# Patient Record
Sex: MALE | Race: WHITE | NOT HISPANIC OR LATINO | Employment: UNEMPLOYED | URBAN - NONMETROPOLITAN AREA
[De-identification: names, ages, dates, MRNs, and addresses within clinical notes are randomized per-mention and may not be internally consistent; named-entity substitution may affect disease eponyms.]

---

## 2021-03-15 ENCOUNTER — LAB (OUTPATIENT)
Dept: LAB | Facility: HOSPITAL | Age: 66
End: 2021-03-15

## 2021-03-15 ENCOUNTER — TRANSCRIBE ORDERS (OUTPATIENT)
Dept: ADMINISTRATIVE | Facility: HOSPITAL | Age: 66
End: 2021-03-15

## 2021-03-15 DIAGNOSIS — F51.4 SLEEP TERROR DISORDER: ICD-10-CM

## 2021-03-15 DIAGNOSIS — I10 ESSENTIAL HYPERTENSION: ICD-10-CM

## 2021-03-15 DIAGNOSIS — J06.9 ACUTE UPPER RESPIRATORY INFECTION: ICD-10-CM

## 2021-03-15 DIAGNOSIS — E78.01 ESSENTIAL FAMILIAL HYPERCHOLESTEROLEMIA: Primary | ICD-10-CM

## 2021-03-15 DIAGNOSIS — J32.9 CHRONIC SINUSITIS, UNSPECIFIED LOCATION: ICD-10-CM

## 2021-03-15 DIAGNOSIS — E78.01 ESSENTIAL FAMILIAL HYPERCHOLESTEROLEMIA: ICD-10-CM

## 2021-03-15 LAB
BASOPHILS # BLD AUTO: 0.05 10*3/MM3 (ref 0–0.2)
BASOPHILS NFR BLD AUTO: 0.7 % (ref 0–1.5)
DEPRECATED RDW RBC AUTO: 40.6 FL (ref 37–54)
EOSINOPHIL # BLD AUTO: 0.23 10*3/MM3 (ref 0–0.4)
EOSINOPHIL NFR BLD AUTO: 3 % (ref 0.3–6.2)
ERYTHROCYTE [DISTWIDTH] IN BLOOD BY AUTOMATED COUNT: 12.4 % (ref 12.3–15.4)
HBA1C MFR BLD: 5.91 % (ref 4.8–5.6)
HCT VFR BLD AUTO: 42.3 % (ref 37.5–51)
HGB BLD-MCNC: 14.2 G/DL (ref 13–17.7)
IMM GRANULOCYTES # BLD AUTO: 0.02 10*3/MM3 (ref 0–0.05)
IMM GRANULOCYTES NFR BLD AUTO: 0.3 % (ref 0–0.5)
LYMPHOCYTES # BLD AUTO: 1.79 10*3/MM3 (ref 0.7–3.1)
LYMPHOCYTES NFR BLD AUTO: 23.4 % (ref 19.6–45.3)
MCH RBC QN AUTO: 30.3 PG (ref 26.6–33)
MCHC RBC AUTO-ENTMCNC: 33.6 G/DL (ref 31.5–35.7)
MCV RBC AUTO: 90.4 FL (ref 79–97)
MONOCYTES # BLD AUTO: 0.59 10*3/MM3 (ref 0.1–0.9)
MONOCYTES NFR BLD AUTO: 7.7 % (ref 5–12)
NEUTROPHILS NFR BLD AUTO: 4.96 10*3/MM3 (ref 1.7–7)
NEUTROPHILS NFR BLD AUTO: 64.9 % (ref 42.7–76)
NRBC BLD AUTO-RTO: 0 /100 WBC (ref 0–0.2)
PLATELET # BLD AUTO: 263 10*3/MM3 (ref 140–450)
PMV BLD AUTO: 10.5 FL (ref 6–12)
RBC # BLD AUTO: 4.68 10*6/MM3 (ref 4.14–5.8)
WBC # BLD AUTO: 7.64 10*3/MM3 (ref 3.4–10.8)

## 2021-03-15 PROCEDURE — 80053 COMPREHEN METABOLIC PANEL: CPT

## 2021-03-15 PROCEDURE — 86140 C-REACTIVE PROTEIN: CPT

## 2021-03-15 PROCEDURE — 82306 VITAMIN D 25 HYDROXY: CPT

## 2021-03-15 PROCEDURE — 84439 ASSAY OF FREE THYROXINE: CPT

## 2021-03-15 PROCEDURE — 36415 COLL VENOUS BLD VENIPUNCTURE: CPT

## 2021-03-15 PROCEDURE — 85025 COMPLETE CBC W/AUTO DIFF WBC: CPT

## 2021-03-15 PROCEDURE — 83036 HEMOGLOBIN GLYCOSYLATED A1C: CPT

## 2021-03-15 PROCEDURE — 84443 ASSAY THYROID STIM HORMONE: CPT

## 2021-03-15 PROCEDURE — 80061 LIPID PANEL: CPT

## 2021-03-16 LAB
25(OH)D3 SERPL-MCNC: 16.7 NG/ML (ref 30–100)
ALBUMIN SERPL-MCNC: 4.2 G/DL (ref 3.5–5.2)
ALBUMIN/GLOB SERPL: 1.4 G/DL
ALP SERPL-CCNC: 92 U/L (ref 39–117)
ALT SERPL W P-5'-P-CCNC: 40 U/L (ref 1–41)
ANION GAP SERPL CALCULATED.3IONS-SCNC: 10.2 MMOL/L (ref 5–15)
AST SERPL-CCNC: 24 U/L (ref 1–40)
BILIRUB SERPL-MCNC: 0.3 MG/DL (ref 0–1.2)
BUN SERPL-MCNC: 16 MG/DL (ref 8–23)
BUN/CREAT SERPL: 15.5 (ref 7–25)
CALCIUM SPEC-SCNC: 9.3 MG/DL (ref 8.6–10.5)
CHLORIDE SERPL-SCNC: 101 MMOL/L (ref 98–107)
CHOLEST SERPL-MCNC: 155 MG/DL (ref 0–200)
CO2 SERPL-SCNC: 29.8 MMOL/L (ref 22–29)
CREAT SERPL-MCNC: 1.03 MG/DL (ref 0.76–1.27)
CRP SERPL-MCNC: 0.67 MG/DL (ref 0–0.5)
GFR SERPL CREATININE-BSD FRML MDRD: 72 ML/MIN/1.73
GLOBULIN UR ELPH-MCNC: 3 GM/DL
GLUCOSE SERPL-MCNC: 98 MG/DL (ref 65–99)
HDLC SERPL-MCNC: 30 MG/DL (ref 40–60)
LDLC SERPL CALC-MCNC: 107 MG/DL (ref 0–100)
LDLC/HDLC SERPL: 3.53 {RATIO}
POTASSIUM SERPL-SCNC: 4.1 MMOL/L (ref 3.5–5.2)
PROT SERPL-MCNC: 7.2 G/DL (ref 6–8.5)
SODIUM SERPL-SCNC: 141 MMOL/L (ref 136–145)
T4 FREE SERPL-MCNC: 1.05 NG/DL (ref 0.93–1.7)
TRIGL SERPL-MCNC: 95 MG/DL (ref 0–150)
TSH SERPL DL<=0.05 MIU/L-ACNC: 1.06 UIU/ML (ref 0.27–4.2)
VLDLC SERPL-MCNC: 18 MG/DL (ref 5–40)

## 2021-11-05 ENCOUNTER — HOSPITAL ENCOUNTER (EMERGENCY)
Facility: HOSPITAL | Age: 66
Discharge: HOME | End: 2021-11-06
Attending: EMERGENCY MEDICINE | Admitting: EMERGENCY MEDICINE

## 2021-11-05 ENCOUNTER — APPOINTMENT (OUTPATIENT)
Dept: CT IMAGING | Facility: HOSPITAL | Age: 66
End: 2021-11-05

## 2021-11-05 DIAGNOSIS — M54.50 ACUTE RIGHT-SIDED LOW BACK PAIN WITHOUT SCIATICA: Primary | ICD-10-CM

## 2021-11-05 LAB
BASOPHILS # BLD AUTO: 0.05 10*3/MM3 (ref 0–0.2)
BASOPHILS NFR BLD AUTO: 0.5 % (ref 0–1.5)
DEPRECATED RDW RBC AUTO: 43.7 FL (ref 37–54)
EOSINOPHIL # BLD AUTO: 0.32 10*3/MM3 (ref 0–0.4)
EOSINOPHIL NFR BLD AUTO: 3 % (ref 0.3–6.2)
ERYTHROCYTE [DISTWIDTH] IN BLOOD BY AUTOMATED COUNT: 13 % (ref 12.3–15.4)
HCT VFR BLD AUTO: 42.6 % (ref 37.5–51)
HGB BLD-MCNC: 13.6 G/DL (ref 13–17.7)
IMM GRANULOCYTES # BLD AUTO: 0.02 10*3/MM3 (ref 0–0.05)
IMM GRANULOCYTES NFR BLD AUTO: 0.2 % (ref 0–0.5)
LYMPHOCYTES # BLD AUTO: 2.98 10*3/MM3 (ref 0.7–3.1)
LYMPHOCYTES NFR BLD AUTO: 28.3 % (ref 19.6–45.3)
MCH RBC QN AUTO: 29.5 PG (ref 26.6–33)
MCHC RBC AUTO-ENTMCNC: 31.9 G/DL (ref 31.5–35.7)
MCV RBC AUTO: 92.4 FL (ref 79–97)
MONOCYTES # BLD AUTO: 1.16 10*3/MM3 (ref 0.1–0.9)
MONOCYTES NFR BLD AUTO: 11 % (ref 5–12)
NEUTROPHILS NFR BLD AUTO: 57 % (ref 42.7–76)
NEUTROPHILS NFR BLD AUTO: 6 10*3/MM3 (ref 1.7–7)
NRBC BLD AUTO-RTO: 0 /100 WBC (ref 0–0.2)
PLATELET # BLD AUTO: 242 10*3/MM3 (ref 140–450)
PMV BLD AUTO: 9.6 FL (ref 6–12)
RBC # BLD AUTO: 4.61 10*6/MM3 (ref 4.14–5.8)
WBC # BLD AUTO: 10.53 10*3/MM3 (ref 3.4–10.8)

## 2021-11-05 PROCEDURE — 25010000002 ONDANSETRON PER 1 MG: Performed by: NURSE PRACTITIONER

## 2021-11-05 PROCEDURE — 25010000002 HYDROMORPHONE 1 MG/ML SOLUTION: Performed by: NURSE PRACTITIONER

## 2021-11-05 PROCEDURE — 85025 COMPLETE CBC W/AUTO DIFF WBC: CPT | Performed by: NURSE PRACTITIONER

## 2021-11-05 PROCEDURE — 72131 CT LUMBAR SPINE W/O DYE: CPT

## 2021-11-05 PROCEDURE — 81003 URINALYSIS AUTO W/O SCOPE: CPT | Performed by: NURSE PRACTITIONER

## 2021-11-05 PROCEDURE — 99283 EMERGENCY DEPT VISIT LOW MDM: CPT

## 2021-11-05 PROCEDURE — 96375 TX/PRO/DX INJ NEW DRUG ADDON: CPT

## 2021-11-05 PROCEDURE — 96374 THER/PROPH/DIAG INJ IV PUSH: CPT

## 2021-11-05 PROCEDURE — 80053 COMPREHEN METABOLIC PANEL: CPT | Performed by: NURSE PRACTITIONER

## 2021-11-05 RX ORDER — ONDANSETRON 2 MG/ML
4 INJECTION INTRAMUSCULAR; INTRAVENOUS ONCE
Status: COMPLETED | OUTPATIENT
Start: 2021-11-05 | End: 2021-11-05

## 2021-11-05 RX ORDER — SODIUM CHLORIDE 0.9 % (FLUSH) 0.9 %
10 SYRINGE (ML) INJECTION AS NEEDED
Status: DISCONTINUED | OUTPATIENT
Start: 2021-11-05 | End: 2021-11-06 | Stop reason: HOSPADM

## 2021-11-05 RX ADMIN — HYDROMORPHONE HYDROCHLORIDE 1 MG: 1 INJECTION, SOLUTION INTRAMUSCULAR; INTRAVENOUS; SUBCUTANEOUS at 23:52

## 2021-11-05 RX ADMIN — ONDANSETRON 4 MG: 2 INJECTION INTRAMUSCULAR; INTRAVENOUS at 23:52

## 2021-11-06 VITALS
BODY MASS INDEX: 36.1 KG/M2 | OXYGEN SATURATION: 96 % | TEMPERATURE: 97.8 F | WEIGHT: 230 LBS | SYSTOLIC BLOOD PRESSURE: 134 MMHG | RESPIRATION RATE: 20 BRPM | DIASTOLIC BLOOD PRESSURE: 60 MMHG | HEART RATE: 56 BPM | HEIGHT: 67 IN

## 2021-11-06 LAB
ALBUMIN SERPL-MCNC: 4.49 G/DL (ref 3.5–5.2)
ALBUMIN/GLOB SERPL: 1.4 G/DL
ALP SERPL-CCNC: 79 U/L (ref 39–117)
ALT SERPL W P-5'-P-CCNC: 42 U/L (ref 1–41)
ANION GAP SERPL CALCULATED.3IONS-SCNC: 13 MMOL/L (ref 5–15)
AST SERPL-CCNC: 29 U/L (ref 1–40)
BILIRUB SERPL-MCNC: 0.3 MG/DL (ref 0–1.2)
BILIRUB UR QL STRIP: NEGATIVE
BUN SERPL-MCNC: 16 MG/DL (ref 8–23)
BUN/CREAT SERPL: 16.3 (ref 7–25)
CALCIUM SPEC-SCNC: 9.5 MG/DL (ref 8.6–10.5)
CHLORIDE SERPL-SCNC: 102 MMOL/L (ref 98–107)
CLARITY UR: CLEAR
CO2 SERPL-SCNC: 26 MMOL/L (ref 22–29)
COLOR UR: YELLOW
CREAT SERPL-MCNC: 0.98 MG/DL (ref 0.76–1.27)
GFR SERPL CREATININE-BSD FRML MDRD: 77 ML/MIN/1.73
GLOBULIN UR ELPH-MCNC: 3.1 GM/DL
GLUCOSE SERPL-MCNC: 99 MG/DL (ref 65–99)
GLUCOSE UR STRIP-MCNC: NEGATIVE MG/DL
HGB UR QL STRIP.AUTO: NEGATIVE
HOLD SPECIMEN: NORMAL
HOLD SPECIMEN: NORMAL
KETONES UR QL STRIP: NEGATIVE
LEUKOCYTE ESTERASE UR QL STRIP.AUTO: NEGATIVE
NITRITE UR QL STRIP: NEGATIVE
PH UR STRIP.AUTO: 6.5 [PH] (ref 5–8)
POTASSIUM SERPL-SCNC: 3.9 MMOL/L (ref 3.5–5.2)
PROT SERPL-MCNC: 7.6 G/DL (ref 6–8.5)
PROT UR QL STRIP: NEGATIVE
SODIUM SERPL-SCNC: 141 MMOL/L (ref 136–145)
SP GR UR STRIP: 1.02 (ref 1–1.03)
UROBILINOGEN UR QL STRIP: NORMAL
WHOLE BLOOD HOLD SPECIMEN: NORMAL

## 2021-11-06 PROCEDURE — 25010000002 DEXAMETHASONE PER 1 MG: Performed by: NURSE PRACTITIONER

## 2021-11-06 PROCEDURE — 96372 THER/PROPH/DIAG INJ SC/IM: CPT

## 2021-11-06 RX ORDER — CYCLOBENZAPRINE HCL 10 MG
10 TABLET ORAL 3 TIMES DAILY PRN
Qty: 21 TABLET | Refills: 0 | Status: SHIPPED | OUTPATIENT
Start: 2021-11-06 | End: 2021-11-13

## 2021-11-06 RX ORDER — METHYLPREDNISOLONE 4 MG/1
TABLET ORAL
Qty: 21 TABLET | Refills: 0 | Status: SHIPPED | OUTPATIENT
Start: 2021-11-06 | End: 2022-09-07

## 2021-11-06 RX ORDER — DEXAMETHASONE SODIUM PHOSPHATE 10 MG/ML
8 INJECTION INTRAMUSCULAR; INTRAVENOUS ONCE
Status: COMPLETED | OUTPATIENT
Start: 2021-11-06 | End: 2021-11-06

## 2021-11-06 RX ADMIN — DEXAMETHASONE SODIUM PHOSPHATE 8 MG: 10 INJECTION INTRAMUSCULAR; INTRAVENOUS at 00:59

## 2021-11-06 NOTE — ED PROVIDER NOTES
"Subjective   Patient is a 66-year-old male with past medical history significant for hypertension, heart disease, GERD, BPH, and depression.  He presents to the ED today with complaints of lower back pain that started this morning.  He reports pain to be a 9 out of 10 and describes pain as \"aching \", denies any radiation of pain.  He denies any known injury.  He states that he does have a history of back pain and states that he had back surgery about 12 years ago by Dr. Schwab with neurosurgery in [place].  Patient denies any other significant complaints at this time.      Back Pain  Location:  Lumbar spine  Quality:  Aching  Radiates to:  Does not radiate  Pain severity:  Severe  Timing:  Constant  Progression:  Waxing and waning  Context: not falling, not jumping from heights, not lifting heavy objects, not occupational injury, not physical stress, not recent injury and not twisting    Relieved by:  Nothing  Worsened by:  Ambulation  Associated symptoms: no abdominal pain, no abdominal swelling, no bladder incontinence, no bowel incontinence, no chest pain, no dysuria, no fever, no headaches, no leg pain, no numbness, no paresthesias, no pelvic pain, no perianal numbness, no tingling and no weakness        Review of Systems   Constitutional: Negative.  Negative for fever.   HENT: Negative.    Eyes: Negative.    Respiratory: Negative.    Cardiovascular: Negative.  Negative for chest pain.   Gastrointestinal: Negative.  Negative for abdominal pain and bowel incontinence.   Endocrine: Negative.    Genitourinary: Negative.  Negative for bladder incontinence, dysuria and pelvic pain.   Musculoskeletal: Positive for back pain. Negative for neck pain and neck stiffness.   Skin: Negative.    Allergic/Immunologic: Negative.    Neurological: Negative.  Negative for tingling, weakness, numbness, headaches and paresthesias.   Hematological: Negative.    Psychiatric/Behavioral: Negative.    All other systems reviewed and are " negative.      No past medical history on file.    Allergies   Allergen Reactions   • [place] Anaphylaxis       No past surgical history on file.    No family history on file.    Social History     Socioeconomic History   • Marital status:            Objective   Physical Exam  Vitals and nursing note reviewed.   Constitutional:       General: He is not in acute distress.     Appearance: He is well-developed and normal weight. He is not ill-appearing, toxic-appearing or diaphoretic.   HENT:      Head: Normocephalic and atraumatic.      Right Ear: External ear normal.      Left Ear: External ear normal.      Nose: Nose normal.      Mouth/Throat:      Mouth: Mucous membranes are moist.      Pharynx: Oropharynx is clear.   Eyes:      Conjunctiva/sclera: Conjunctivae normal.      Pupils: Pupils are equal, round, and reactive to light.   Neck:      Vascular: No JVD.      Trachea: No tracheal deviation.   Cardiovascular:      Rate and Rhythm: Normal rate and regular rhythm.      Pulses: Normal pulses.      Heart sounds: Normal heart sounds. No murmur heard.      Pulmonary:      Effort: Pulmonary effort is normal. No respiratory distress.      Breath sounds: Normal breath sounds. No wheezing.   Abdominal:      General: Bowel sounds are normal.      Palpations: Abdomen is soft.      Tenderness: There is no abdominal tenderness.   Musculoskeletal:         General: Tenderness present. No deformity.      Cervical back: Normal range of motion and neck supple.      Lumbar back: Tenderness present. Decreased range of motion.   Skin:     General: Skin is warm and dry.      Capillary Refill: Capillary refill takes less than 2 seconds.      Coloration: Skin is not pale.      Findings: No erythema or rash.   Neurological:      General: No focal deficit present.      Mental Status: He is alert and oriented to person, place, and time. Mental status is at baseline.      Cranial Nerves: No cranial nerve deficit.   Psychiatric:          Behavior: Behavior normal.         Thought Content: Thought content normal.         Procedures       Results for orders placed or performed during the hospital encounter of 11/05/21   Comprehensive Metabolic Panel    Specimen: Blood   Result Value Ref Range    Glucose 99 65 - 99 mg/dL    BUN 16 8 - 23 mg/dL    Creatinine 0.98 0.76 - 1.27 mg/dL    Sodium 141 136 - 145 mmol/L    Potassium 3.9 3.5 - 5.2 mmol/L    Chloride 102 98 - 107 mmol/L    CO2 26.0 22.0 - 29.0 mmol/L    Calcium 9.5 8.6 - 10.5 mg/dL    Total Protein 7.6 6.0 - 8.5 g/dL    Albumin 4.49 3.50 - 5.20 g/dL    ALT (SGPT) 42 (H) 1 - 41 U/L    AST (SGOT) 29 1 - 40 U/L    Alkaline Phosphatase 79 39 - 117 U/L    Total Bilirubin 0.3 0.0 - 1.2 mg/dL    eGFR Non African Amer 77 >60 mL/min/1.73    Globulin 3.1 gm/dL    A/G Ratio 1.4 g/dL    BUN/Creatinine Ratio 16.3 7.0 - 25.0    Anion Gap 13.0 5.0 - 15.0 mmol/L   Urinalysis With Culture If Indicated - Urine, Clean Catch    Specimen: Urine, Clean Catch   Result Value Ref Range    Color, UA Yellow Yellow, Straw    Appearance, UA Clear Clear    pH, UA 6.5 5.0 - 8.0    Specific Gravity, UA 1.023 1.005 - 1.030    Glucose, UA Negative Negative    Ketones, UA Negative Negative    Bilirubin, UA Negative Negative    Blood, UA Negative Negative    Protein, UA Negative Negative    Leuk Esterase, UA Negative Negative    Nitrite, UA Negative Negative    Urobilinogen, UA 1.0 E.U./dL 0.2 - 1.0 E.U./dL   CBC Auto Differential    Specimen: Blood   Result Value Ref Range    WBC 10.53 3.40 - 10.80 10*3/mm3    RBC 4.61 4.14 - 5.80 10*6/mm3    Hemoglobin 13.6 13.0 - 17.7 g/dL    Hematocrit 42.6 37.5 - 51.0 %    MCV 92.4 79.0 - 97.0 fL    MCH 29.5 26.6 - 33.0 pg    MCHC 31.9 31.5 - 35.7 g/dL    RDW 13.0 12.3 - 15.4 %    RDW-SD 43.7 37.0 - 54.0 fl    MPV 9.6 6.0 - 12.0 fL    Platelets 242 140 - 450 10*3/mm3    Neutrophil % 57.0 42.7 - 76.0 %    Lymphocyte % 28.3 19.6 - 45.3 %    Monocyte % 11.0 5.0 - 12.0 %    Eosinophil % 3.0 0.3 -  6.2 %    Basophil % 0.5 0.0 - 1.5 %    Immature Grans % 0.2 0.0 - 0.5 %    Neutrophils, Absolute 6.00 1.70 - 7.00 10*3/mm3    Lymphocytes, Absolute 2.98 0.70 - 3.10 10*3/mm3    Monocytes, Absolute 1.16 (H) 0.10 - 0.90 10*3/mm3    Eosinophils, Absolute 0.32 0.00 - 0.40 10*3/mm3    Basophils, Absolute 0.05 0.00 - 0.20 10*3/mm3    Immature Grans, Absolute 0.02 0.00 - 0.05 10*3/mm3    nRBC 0.0 0.0 - 0.2 /100 WBC   Green Top (Gel)   Result Value Ref Range    Extra Tube Hold for add-ons.    Lavender Top   Result Value Ref Range    Extra Tube hold for add-on    Gold Top - SST   Result Value Ref Range    Extra Tube Hold for add-ons.        ED Course  ED Course as of 11/06/21 0106   Sat Nov 06, 2021   0047 CT Lumbar Spine Without Contrast  IMPRESSION:  No acute changes. There is multilevel lumbar spondylosis secondary to a congenitally narrow canal as well as multilevel disc and facet disease. No evidence of a right-sided disc herniation. The right-sided lumbar foramina are mildly narrowed from  degenerative changes. The most severe foraminal narrowing is at L3-4 on the left. There are postoperative changes on the left at L5 and S1. [MB]      ED Course User Index  [MB] Arleen Aden APRN                                           [facility]    Final diagnoses:   Acute right-sided low back pain without sciatica       ED Disposition  ED Disposition     ED Disposition Condition Comment    Discharge Stable           Larry Vasquez PA  [address] [place], [place] [id number]  [phone]    Call in 2 days      Aubrey Schwab MD    Call in 2 days           Medication List      New Prescriptions    cyclobenzaprine 10 MG tablet  Commonly known as: FLEXERIL  Take 1 tablet by mouth 3 (Three) Times a Day As Needed for Muscle Spasms for up to 7 days.     methylPREDNISolone 4 MG dose pack  Commonly known as: MEDROL  Take as directed on package instructions.           Where to Get Your Medications      These medications were sent to [facility] - [facility] - [address] [facility] [address] - [phone]  - [phone] FX  [address] [address] [address]    Phone: [phone]   · cyclobenzaprine 10 MG tablet  · methylPREDNISolone 4 MG dose pack          Arleen Aden APRN  11/06/21 0058       Satinder Waters MD  11/06/21 0101

## 2022-09-07 ENCOUNTER — OFFICE VISIT (OUTPATIENT)
Dept: UROLOGY | Facility: CLINIC | Age: 67
End: 2022-09-07

## 2022-09-07 VITALS — HEIGHT: 67 IN | WEIGHT: 275 LBS | BODY MASS INDEX: 43.16 KG/M2

## 2022-09-07 DIAGNOSIS — R97.20 BPH WITH ELEVATED PSA: Primary | ICD-10-CM

## 2022-09-07 DIAGNOSIS — R35.0 FREQUENCY OF URINATION: ICD-10-CM

## 2022-09-07 DIAGNOSIS — N40.0 BPH WITHOUT OBSTRUCTION/LOWER URINARY TRACT SYMPTOMS: ICD-10-CM

## 2022-09-07 DIAGNOSIS — N40.0 BPH WITH ELEVATED PSA: Primary | ICD-10-CM

## 2022-09-07 LAB
BILIRUB BLD-MCNC: ABNORMAL MG/DL
CLARITY, POC: CLEAR
COLOR UR: ABNORMAL
EXPIRATION DATE: ABNORMAL
GLUCOSE UR STRIP-MCNC: NEGATIVE MG/DL
KETONES UR QL: NEGATIVE
LEUKOCYTE EST, POC: NEGATIVE
Lab: ABNORMAL
NITRITE UR-MCNC: NEGATIVE MG/ML
PH UR: 6 [PH] (ref 5–8)
PROT UR STRIP-MCNC: ABNORMAL MG/DL
RBC # UR STRIP: NEGATIVE /UL
SP GR UR: 1.02 (ref 1–1.03)
UROBILINOGEN UR QL: NORMAL

## 2022-09-07 PROCEDURE — 84154 ASSAY OF PSA FREE: CPT | Performed by: NURSE PRACTITIONER

## 2022-09-07 PROCEDURE — 84153 ASSAY OF PSA TOTAL: CPT | Performed by: NURSE PRACTITIONER

## 2022-09-07 PROCEDURE — 99204 OFFICE O/P NEW MOD 45 MIN: CPT | Performed by: NURSE PRACTITIONER

## 2022-09-07 PROCEDURE — 81003 URINALYSIS AUTO W/O SCOPE: CPT | Performed by: NURSE PRACTITIONER

## 2022-09-07 RX ORDER — ESOMEPRAZOLE MAGNESIUM 40 MG/1
1 CAPSULE, DELAYED RELEASE ORAL DAILY
COMMUNITY
Start: 2021-06-28

## 2022-09-07 RX ORDER — RAMIPRIL 10 MG/1
1 CAPSULE ORAL DAILY
COMMUNITY
Start: 2021-06-28

## 2022-09-07 RX ORDER — CITALOPRAM 20 MG/1
1 TABLET ORAL DAILY
COMMUNITY
Start: 2021-06-28

## 2022-09-07 RX ORDER — TAMSULOSIN HYDROCHLORIDE 0.4 MG/1
1 CAPSULE ORAL
COMMUNITY
Start: 2021-06-28

## 2022-09-07 RX ORDER — CETIRIZINE HYDROCHLORIDE 10 MG/1
1 TABLET ORAL DAILY
COMMUNITY
Start: 2021-06-28

## 2022-09-07 RX ORDER — METHOCARBAMOL 500 MG/1
TABLET, FILM COATED ORAL EVERY 6 HOURS
COMMUNITY
Start: 2021-06-28

## 2022-09-07 RX ORDER — AMLODIPINE BESYLATE 5 MG/1
1 TABLET ORAL DAILY
COMMUNITY
Start: 2021-06-28

## 2022-09-07 RX ORDER — HYDROCHLOROTHIAZIDE 25 MG/1
1 TABLET ORAL DAILY
COMMUNITY
Start: 2021-06-28

## 2022-09-07 RX ORDER — ATENOLOL 25 MG/1
1 TABLET ORAL DAILY
COMMUNITY
Start: 2021-06-28

## 2022-09-07 NOTE — PROGRESS NOTES
Chief Complaint  BPH WITH ELEVATED PSA/LUTS (NEW PATIENT WITH ELEVATED PSA OF 8.0/BPH WITH LUTS)    Jerrell Whitten presents to [facility] for BPH WITH PSA OF 8.0(0.0-4.0)  History of Present Illness    MR MARY ELLEN WHITTEN is a very pleasant 66-year-old male with a significant history of BPH with LUTS who presents to clinic today for evaluation.  The patient has been referred to us by his PCP with concerns of elevated PSA of 8.0 drawn on 07/26/2022.  Patient reports his last PSA in July 2021 was 3.0.  He is unsure of his other prior PSA results, patient was recently started on Flomax -medications for his prostate, and denies any side effects from the medications.  He reports a negative family history of prostate cancer.  He denies any recent infection.    He is also here for his yearly prostate exam which is unremarkable.There is no nodularity or any suspicious rectal abnormalities. He denies having any urinary symptoms of frequency urgency or dysuria. He has nocturia 1-2 times occasionally, denies any issues with recurrent UTIs, burning with urination, urinary hesitancy OR post void dribbling. He has been compliant on his daily regimen of Flomax and Cialis as prescribed, patient denies any side effects from medications.    Patient reports he was a  for greater than 20 years.  However, he is glad to be doing better at this age, Patient denies any issues with BPH recently. Denies pelvic pressure or supra pubic discomfort. Denies abdominal pain , flank pain,  He does not have any CVA tenderness, no N/V/D.  He has chronic back pain from prior surgeries over 12 years ago by Dr. Schwab with neurosurgery in [place]. Denies perineal discomfort or any feeling of sitting on eggshells consistent with prostatitis.  Denies chills or fevers, he does not have N/V/D.  His urine dipstick today is completely negative for any infection, it is negative for  "gross/microscopic hematuria. His IPSS score is 5, his PVR is 0cc    Besides a BMI of 43.07, the patient is relatively healthy.  He reports a history of HTN, HLD, JESSICA, GERD, BPH and Depression.  She reports a family history of breast cancer his mom and sister, denies history of colon cancer, bladder cancer, uterine cancer, or prostate cancer.    The rest of his PMHx as listed in chart    Objective   Vital Signs:   Ht 170.2 cm (67\")   Wt 125 kg (275 lb)   BMI 43.07 kg/m²       ROS:   Review of Systems   Constitutional: Positive for activity change, fatigue and unexpected weight gain. Negative for appetite change, chills, diaphoresis, fever and unexpected weight loss.   HENT: Negative for congestion, ear discharge, ear pain, nosebleeds, rhinorrhea, sinus pressure and sore throat.    Eyes: Negative for blurred vision, double vision, photophobia, pain, redness and visual disturbance.   Respiratory: Negative for apnea, cough, chest tightness, shortness of breath, wheezing and stridor.    Cardiovascular: Negative for chest pain and palpitations.   Gastrointestinal: Positive for abdominal distention, constipation and GERD. Negative for abdominal pain, diarrhea, nausea and vomiting.   Endocrine: Negative for polydipsia, polyphagia and polyuria.   Genitourinary: Positive for frequency, nocturia, urgency and urinary incontinence (  Post void dribbling). Negative for decreased urine volume, difficulty urinating, discharge, dysuria, flank pain, genital sores, hematuria, penile pain, penile swelling, scrotal swelling and testicular pain.   Musculoskeletal: Positive for back pain and myalgias. Negative for arthralgias and joint swelling.   Skin: Positive for dry skin and pallor. Negative for rash and wound.   Neurological: Negative for dizziness, tremors, syncope, weakness, light-headedness, memory problem and confusion.   Psychiatric/Behavioral: Positive for sleep disturbance and stress. Negative for agitation, behavioral " problems and decreased concentration.        Physical Exam  Constitutional:       General: He is in acute distress.      Appearance: He is well-developed. He is obese.   HENT:      Head: Normocephalic and atraumatic.      Right Ear: External ear normal.      Left Ear: External ear normal.   Eyes:      General:         Right eye: No discharge.         Left eye: No discharge.      Conjunctiva/sclera: Conjunctivae normal.      Pupils: Pupils are equal, round, and reactive to light.   Neck:      Thyroid: No thyromegaly.      Trachea: No tracheal deviation.   Cardiovascular:      Rate and Rhythm: Normal rate and regular rhythm.      Heart sounds: No murmur heard.    No friction rub.   Pulmonary:      Effort: Pulmonary effort is normal. No respiratory distress.      Breath sounds: Normal breath sounds. No stridor.   Abdominal:      General: Bowel sounds are normal. There is distension.      Palpations: Abdomen is soft.      Tenderness: There is abdominal tenderness. There is no guarding.   Genitourinary:     Penis: Normal and uncircumcised. No tenderness or discharge.       Testes: Normal.      Rectum: Normal. Guaiac result negative.      Comments: YURI negative for nodules, He has good rectal tone, and ENLARGED, BUT smooth firm prostate. There is no nodularity or any suspicious rectal abnormalities  Otherwise normal external genitalia. Bilateral descended testes without any masses, left slightly and hanging lower than the right testicles. There are no inguinal hernias or abnormalities noted.     Musculoskeletal:         General: Tenderness present. No deformity. Normal range of motion.      Cervical back: Normal range of motion and neck supple.   Skin:     General: Skin is warm and dry.      Capillary Refill: Capillary refill takes less than 2 seconds.      Coloration: Skin is not pale.   Neurological:      Mental Status: He is alert and oriented to person, place, and time.      Cranial Nerves: No cranial nerve deficit.       Motor: Weakness present.      Coordination: Coordination normal.   Psychiatric:         Behavior: Behavior normal.         Thought Content: Thought content normal.         Judgment: Judgment normal.        IPSS Questionnaire (AUA-7):  Over the past month…    1)  How often have you had a sensation of not emptying your bladder completely after you finish urinating?  1 - Less than 1 time in 5   2)  How often have you had to urinate again less than two hours after you finished urinating? 0 - Not at all   3)  How often have you found you stopped and started again several times when you urinated?  1 - Less than 1 time in 5   4) How difficult have you found it to postpone urination?  0 - Not at all   5) How often have you had a weak urinary stream?  0 - Not at all   6) How often have you had to push or strain to begin urination?  1 - Less than 1 time in 5   7) How many times did you most typically get up to urinate from the time you went to bed until the time you got up in the morning?  2 - 2 times   Total score:  0-7 mildly symptomatic                                                                  5    8-19 moderately symptomatic    20-35 severely symptomatic       Result Review :     UA    Urinalysis 11/5/21 9/7/22   Specific [place], UA 1.023    Ketones, UA Negative Negative   Blood, UA Negative    Leukocytes, UA Negative Negative   Nitrite, UA Negative                PSA    PSA 9/7/22   PSA 3.8      Comments are available for some flowsheets but are not being displayed.                Assessment and Plan    Problem List Items Addressed This Visit    None     Visit Diagnoses     BPH with elevated PSA    -  Primary    Relevant Orders    PSA Total+% Free (Serial) (Completed)    BPH without obstruction/lower urinary tract symptoms        Relevant Orders    PSA Total+% Free (Serial) (Completed)    Frequency of urination        Relevant Orders    POC Urinalysis Dipstick, Automated (Completed)          Patient reports  that he is not currently experiencing any symptoms of urinary incontinence.    Class 2 Severe Obesity (BMI >=35 and <=39.9). Obesity-related health conditions include the following: obstructive sleep apnea, hypertension, dyslipidemias, GERD and urinary stress incontinence. Obesity is improving with lifestyle modifications. BMI is 43.07 kg/M2. We discussed portion control and increasing exercise.    RADIOLOGY (CT AND/OR KUB):    CT Abdomen and Pelvis: No results found for this or any previous visit.     CT Stone Protocol: No results found for this or any previous visit.     KUB: No results found for this or any previous visit.       LABS (3 MONTHS):    Office Visit on 09/07/2022   Component Date Value Ref Range Status   • Color 09/07/2022 Dark Yellow  Yellow, Straw, Dark Yellow, Rosa Final   • Clarity, UA 09/07/2022 Clear  Clear Final   • Specific Gravity  09/07/2022 1.025  1.005 - 1.030 Final   • pH, Urine 09/07/2022 6.0  5.0 - 8.0 Final   • Leukocytes 09/07/2022 Negative  Negative Final   • Nitrite, UA 09/07/2022 Negative  Negative Final   • Protein, POC 09/07/2022 Trace (A) Negative mg/dL Final   • Glucose, UA 09/07/2022 Negative  Negative mg/dL Final   • Ketones, UA 09/07/2022 Negative  Negative Final   • Urobilinogen, UA 09/07/2022 Normal  Normal, 0.2 E.U./dL Final   • Bilirubin 09/07/2022 Small (1+) (A) Negative Final   • Blood, UA 09/07/2022 Negative  Negative Final   • Lot Number 09/07/2022 [id number]   Final   • Expiration Date 09/07/2022 12/21/2023   Final   • PSA 09/07/2022 3.8  0.0 - 4.0 ng/mL Final    Roche ECLIA methodology.  According to the American Urological Association, Serum PSA should  decrease and remain at undetectable levels after radical  prostatectomy. The AUA defines biochemical recurrence as an initial  PSA value 0.2 ng/mL or greater followed by a subsequent confirmatory  PSA value 0.2 ng/mL or greater.  Values obtained with different assay methods or kits cannot be  used  interchangeably. Results cannot be interpreted as absolute evidence  of the presence or absence of malignant disease.   • PSA, Free 09/07/2022 0.75  N/A ng/mL Final    Roche ECLIA methodology.   • PSA, Free % 09/07/2022 19.7  % Final    The table below lists the probability of prostate cancer for  men with non-suspicious YURI results and total PSA between  4 and 10 ng/mL, by patient age (Tomeka et al, MARISA 1998,  279:1542).                    % Free PSA       50-64 yr        65-75 yr                    0.00-10.00%        56%             55%                   10.01-15.00%        24%             35%                   15.01-20.00%        17%             23%                   20.01-25.00%        10%             20%                        >25.00%         5%              9%  Please note:  Tomeka et al did not make specific                recommendations regarding the use of                percent free PSA for any other population                of men.         ASSESSMENT                                BPH WITH ELEVATED PSA OF 8.0/LUTS  MR MARY ELLEN CRUZ is a very pleasant 66-year-old male with a significant history of BPH with LUTS who presents to clinic today for evaluation, with concerns of elevated PSA of 8.0 done on 07/26/22.  Patient reports his last PSA in July 2021 was 3.0.  He is unsure of his other prior PSA results, patient was recently started on Flomax -medications for his prostate, and denies any side effects from the medications.  He reports a negative family history of prostate cancer.  His urine dipstick today is completely negative for any infection, it is negative for gross/microscopic hematuria. His IPSS score is 5, his PVR is 0cc    BPH: WE Discussed the pathophysiology of BPH and obstruction. We discussed the static and dynamic effects of BPH as well as using 5 alpha reductase inhibitors versus alpha blockade.  We discussed the indications for transurethral surgery as well.  And/ or other  therapeutic options available including all of the newer techniques.  He has NO real symptomatology referable to his prostate other than moderate enlargement.  Recommending we proceed with extensive workup if his repeat PSA/free and total percentages become concerning.  I am also recommending he continue with his alpha blocker tamsulosin 0.4 mg capsule daily.  As prescribed.      Elevated PSA :Pt has been referred to us with a PSA of 8.0, He has a fairly enlarged enlarged/firm prostate with only minor urinary symptoms of nocturia 1-2 times depending on his fluid intake or how late he consumes any beverage prior to bedtime. We discussed the diagnosis of Elevated Prostate-Specific antigen (PSA).  I explained the pathophysiology of PSA.  It is a serine protease. It's  function in the male reproductive tract is to facilitate the liquefaction of semen.  It is for this reason the body does not want it freely floating in the serum and why it is typically bound tightly to albumin.     We discussed why we most typically will use both a PSA free and Total to determine the need for more aggressive therapy. I discussed the normal range, and how it varies with age groups and descent.  Additionally, I explained to him typically PSA was in the range of 1-4 but more recently has been downgraded to something less than 2 or even approaching 1.      I discussed the risk of family history particularly the fact that the average male has a 14% risk of prostate cancer and that in the face of a positive diagnosis in a father it will tablet and any other first-generation relative continued tablet insofar that a father and brother with prostate cancer will produce almost a 50% risk of prostate cancer.  I discussed the use of the temporal use of PSA as the best option for monitoring.  We also discussed the fact that an elevated PSA is an isolated event does not mean that this is prostate cancer and should not engender worry in this  regard.     FINALLY, I also discussed other things that can elevate PSA including constipation, prostatitis, infection, recent intercourse etc, as well as the risks and benefits associated with this.  Also discussed the fact that this is a dilutional test and as a consequence of such, will occasionally produce slight variations on a single specimen.                                             PLAN  Further discussed the risks and benefits of a prostate biopsy as well if indicated.      His PSA was repeated today: Free and total PSA pending to complete the work-up    I will call patient with results and definitive plan of care.    Discussed continue Flomax 0.4 mg as prescribed for BPH.    Patient Agreeable plan of care.    ADDENDUM:09/08/22  Reviewed patient's repeat PSA results today of 3.8, with a free PSA of 19.7% on labs drawn 09/07/2020 2010.  Discussed results with patient, no further work-up recommended at this time.  Patient follow-up in clinic in 1 year.    Smoking Cessation Counseling:  Never a smoker.  Patient does not currently use any tobacco products.     Follow Up   Return in about 6 months (around 3/7/2023) for Next scheduled follow up FOR BPH WITH ELEVATED PSA/LUTS/LABS REDRAW.    Patient was given instructions and counseling regarding his condition or for health maintenance advice. Please see specific information pulled into the AVS if appropriate.          This document has been electronically signed by Griselda Cheng-Akwa, APRN   September 8, 2022 17:51 EDT      Dictated Utilizing Dragon Dictation: Part of this note may be an electronic transcription/translation of spoken language to printed text using the Dragon Dictation System.

## 2022-09-08 ENCOUNTER — TELEPHONE (OUTPATIENT)
Dept: UROLOGY | Facility: CLINIC | Age: 67
End: 2022-09-08

## 2022-09-08 LAB
PSA FREE MFR SERPL: 19.7 %
PSA FREE SERPL-MCNC: 0.75 NG/ML
PSA SERPL-MCNC: 3.8 NG/ML (ref 0–4)

## 2023-07-26 ENCOUNTER — OFFICE VISIT (OUTPATIENT)
Dept: ORTHOPEDIC SURGERY | Facility: CLINIC | Age: 68
End: 2023-07-26
Payer: MEDICARE

## 2023-07-26 VITALS
SYSTOLIC BLOOD PRESSURE: 170 MMHG | BODY MASS INDEX: 44.93 KG/M2 | WEIGHT: 279.6 LBS | DIASTOLIC BLOOD PRESSURE: 108 MMHG | HEIGHT: 66 IN

## 2023-07-26 DIAGNOSIS — M25.572 LEFT ANKLE PAIN, UNSPECIFIED CHRONICITY: Primary | ICD-10-CM

## 2023-07-26 NOTE — PATIENT INSTRUCTIONS
Midfoot Arthritis    Summary  Midfoot arthritis is characterized by pain and swelling in the midfoot, aggravated by standing and walking. There is often an associated bony prominence on the top of the foot. Usually the symptoms develop gradually over time, although it can occur following a major midfoot injury, such as a Lisfranc injury. Non-operative treatment consisting of use of a stiff-soled comfort shoe, activity modification, and weight-loss, can be quite effective. If non-operative treatment fails, patients may benefit from surgery to fuse the arthritic midfoot joints.    Background  The foot consists of 26 separate bones working together to bear body weight. Where they link together, one finds cartilage that helps smooth their motion as they move past each other. As one might imagine, there is a significant amount of force that crosses these joints during normal daily function, as the foot acts to propel us forward during walking or running. If this cartilage wears out, a condition known as arthritis, force across these joints may generate pain as bone starts to rub against bone. The “midfoot” portion of the foot is analogous to the articulation, or joint formation, between our wrists and hand. Tarsal bones, specifically the cuneiforms and cuboid, articulate the long, tubular metatarsals that form the forefoot. Some of the midfoot joints, such as those on the inside of the foot are more supple and have the motion necessary to accommodate uneven surfaces. When arthritis affects the midfoot joints, it is known as midfoot arthritis.    Clinical Presentation  Patients with midfoot arthritis will experience discomfort underlying the midfoot, often related to prolonged standing or walking. It can also be exacerbated by shoe wear, especially if a stiff leather shoe pushes downwards on the top of the foot. Some patients also report pain with the first few steps in the morning or after prolonged sitting,  traditionally referred to as “start-up” pain. Occasionally, there is a history of significant injury to the midfoot, such as a Lisfranc injury. More commonly, midfoot arthritis occurs simply due to gradual wear and tear.    Physical Examination  Physical examination may reveal swelling in the midfoot, but more commonly there is often generalized tenderness in the midfoot area, especially over the affected joints. A bony prominence in the midfoot known as an osteophyte is often palpable as a result of the underlying arthritis.  It is important to note, however, not all bony prominences in the midfoot represent arthritis. It is not uncommon for people to have local shoe wear irritation from a normal bony prominence, known as a tarsal boss. This does not necessarily signify significant midfoot arthritis.    Imaging Studies  Weight-bearing x-rays generally demonstrate loss of joint space in the midfoot joints, which is characteristic of arthritis. The joint between the midfoot and forefoot (tarsometatarsal) is most commonly involved, although the other joints may also be involved.    Treatment  Non-Operative Treatment  Midfoot arthritis can often be managed successfully without surgery. The key components of non-operative treatment are:  A stiff-soled comfort shoe: By having a stiff sole, the amount of force concentrated in the midfoot will be limited.  In a similar manner, a slight rocker contour to the shoe will help disperse the force away from the midfoot and smoothly up the leg.  Shoes with a softer upper: If the upper portion of the shoe is made out of a mesh-like material rather than a stiff leather, the direct pressure over the arthritis may be lessened and thereby the pain improved.  Avoid tying shoe-laces too tightly: If shoelaces are kept somewhat loose, less pressure is placed on the arthritic midfoot.  Activity Modification: Activity modification, such as avoiding rigorous impact activities like running or  prolonged walking, may also be helpful in limiting symptoms. Non-weight-bearing exercise (ex. swimming, aquarobics, stationary bike) is encouraged to help keep active and lose weight.  Weight-loss: Losing excess weight will help to decrease the amount of force going through the arthritic midfoot with each step.  Calf Stretching (Flexibilty improvement of adjacent joints): Unnecessary motion or stress through the midfoot can be decreased by stretching the calf muscle (gastrocsoleus complex and heel cord)  Anti-inflammatory Medications: Non-Steroidal Anti-Inflammatory medications (NSAIDs) may also be beneficial.  Off Loading: Decreasing the load through the midfoot by using assistive devices such as a cane, a knee walker, or crutches may be helpful in the short-term if the symptoms have flared up.  Injections: Periodic corticosteroid injections into the affected midfoot joints may help alleviate pain symptoms, albeit usually only temporarily.    Operative Treatment  Patients may have two problems arising from midfoot arthritis; pain on the dorsum (top) of the foot due to spurs from the arthritic joint (bony prominence), worse in closed shoes or, pain arising from the arthritic joint itself.    Symptoms due to a local bony prominence may benefit from removal of bone spurs, though this is uncommonly performed because it does not change the underlying arthritis and the bone spurs generally return.    In most cases, pain arising from the arthritic joint is the main symptom. Therefore, if an operation is deemed necessary, it is often necessary to fuse the involved joints (midfoot fusion). This entails roughening up the opposing bone surfaces and fixing the bones together with plates, screws, or staples in order to get the two bones to fuse together into one larger bone. By eliminating the movement through the arthritic joints, the pain originating from these joints is eradicated. Essentially, it converts a painful stiff  "joint into a painless stiff joint. However, a midfoot fusion does not preclude pain from other joints and other areas of the foot from continuing to be symptomatic. This type of surgery requires strong fixation and a period of non-weight bearing (or limited weight bearing) for 6-12 weeks.    Potential Complications  Complications of surgery may include:  Nonunion (bones do not heal together)  Delayed union (bones are slow to heal together)  Infection  Wound healing problems  Nerve injury or irritation  Deep Vein Thrombosis (DVT or Blood clot)  Pulmonary embolism (PE)  Removal of hardware  Adjacent joint arthritis, the nearby joints may develop arthritis over time as a consequence of altered foot biomechanics from arthritis and joint fusion.    In addition, patients may have persistent pain, as the fusion of the midfoot joint will not help pain that originates from other areas of the foot, such as tendons, ligaments, or other joints.    The information contained in this handout is copyrighted by [employer]. It may be reproduced in small quantities for non commercial educational purposes. This information is for general education purposes only. It should not be used without guidance from a licensed healthcare provider. Handout Courtesy of www.[employer]                  Online:  www.[employer]  www[employer]./en/us/  www.[employer]    [place], [place]:   [facility] [place]   [address], [address], [facility] [address]   [email or web address]      [employer]   [address], [facility], [address]   [email or web address]      [employer] [facility]   [address], [place] [address]   [email or web address]      [employer] [facility]   [address] , [address], [place] [address]   [email or web address]://www.PublishThis/        [employer]      www."[employer]"  www.amazon.com, type in \"carbon fiber " "insole\"     "

## 2023-07-26 NOTE — PROGRESS NOTES
[facility] [place] [facility] [place]     Office Visit       Date: 07/26/2023   Patient Name: Monster Whitten  MRN: [id number]  YOB: 1955    Referring Physician: Larry Vasquez PA     Chief Complaint:   Chief Complaint   Patient presents with    Left Ankle - Pain       History of Present Illness: Monster Whitten is a 67 y.o. male who is here today with left foot pain.  States has been going on for about 20 years and is gotten worse during that time.  Pain described as aching burning and stabbing.  Denies trauma.  Has not discovered anything that helps with his conditions.  Standing or moving a lot or increased activity makes his symptoms worse.  No previous surgeries.  Has not tried any other type types of treatment.  Patient is retired.  Patient works as a .    Subjective   Review of Systems: Review of Systems   Constitutional: Negative.  Negative for chills, fatigue and fever.   HENT: Negative.  Negative for congestion and dental problem.    Eyes: Negative.  Negative for blurred vision.   Respiratory: Negative.  Negative for shortness of breath.    Cardiovascular: Negative.  Negative for leg swelling.   Gastrointestinal: Negative.  Negative for abdominal pain.   Endocrine: Negative.  Negative for polyuria.   Genitourinary: Negative.  Negative for difficulty urinating.   Musculoskeletal:  Positive for arthralgias.   Skin: Negative.    Allergic/Immunologic: Negative.    Neurological: Negative.    Hematological: Negative.  Negative for adenopathy.   Psychiatric/Behavioral: Negative.  Negative for behavioral problems.       Past Medical History:   Past Medical History:   Diagnosis Date    Hypertension     MI (myocardial infarction)        Past Surgical History:   Past Surgical History:   Procedure Laterality Date    BACK SURGERY      KIDNEY STONE SURGERY         Family History:   Family History   Problem Relation Age of Onset    Kidney cancer Father  "    Breast cancer Mother     Breast cancer Sister        Social History:   Social History     Socioeconomic History    Marital status:    Tobacco Use    Smoking status: Never    Smokeless tobacco: Never   Vaping Use    Vaping Use: Never used   Substance and Sexual Activity    Alcohol use: Yes    Drug use: Never    Sexual activity: Defer       Medications:   Current Outpatient Medications:     amLODIPine (NORVASC) 5 MG tablet, Take 1 tablet by mouth Daily., Disp: , Rfl:     atenolol (TENORMIN) 25 MG tablet, Take 1 tablet by mouth Daily., Disp: , Rfl:     cetirizine (zyrTEC) 10 MG tablet, Take 1 tablet by mouth Daily., Disp: , Rfl:     citalopram (CeleXA) 20 MG tablet, Take 1 tablet by mouth Daily., Disp: , Rfl:     esomeprazole (nexIUM) 40 MG capsule, Take 1 capsule by mouth Daily., Disp: , Rfl:     hydroCHLOROthiazide (HYDRODIURIL) 25 MG tablet, Take 1 tablet by mouth Daily., Disp: , Rfl:     methocarbamol (ROBAXIN) 500 MG tablet, Take  by mouth Every 6 (Six) Hours., Disp: , Rfl:     ramipril (ALTACE) 10 MG capsule, Take 1 capsule by mouth Daily., Disp: , Rfl:     tamsulosin (FLOMAX) 0.4 MG capsule 24 hr capsule, Take 1 capsule by mouth., Disp: , Rfl:     Allergies:   Allergies   Allergen Reactions    [place] Anaphylaxis       I reviewed the patient's chief complaint, history of present illness, review of systems, past medical history, surgical history, family history, social history, medications and allergy list.     Objective    Vital Signs:   Vitals:    07/26/23 0921   BP: (!) 170/108   Weight: 127 kg (279 lb 9.6 oz)   Height: 167.6 cm (66\")     Body mass index is 45.13 kg/m².    Ortho Exam:  Morbid obesity  left LE Foot and Ankle Exam:   Normal gait pattern. Hindfoot alignment is neutral. Plantigrade foot.   Neurological exam of the superficial peroneal, deep peroneal, plantar, sural and saphenous nerves demonstrates intact sensation and normal motor function.  10/10 sites felt on monofilament testing of " foot.    There is good perfusion to the toes.   The skin is intact throughout the foot and ankle without ulceration.   Range of motion of ankle, subtalar joint, and toes is within normal limits.   There is tenderness to palpation over the TMT, midfoot pain with midfoot stress, Negative tinels over deep peroneal nerve.  There is some swelling about the left midfoot relative to the right    Results Review:   Imaging Results (Last 24 Hours)       Procedure Component Value Units Date/Time    XR Foot 3+ View Left [[id number]] Resulted: 07/26/23 1001     Updated: 07/26/23 1002    Narrative:      Left Foot X-Ray 07/26/23   Indication: Pain  Views: 3 weight bearing , comparison none  Findings: xrays reviewed by me today in the office and show no acute   osseous abnormality, degenerative changes consistent with age, slight pes   planus deformity      XR Ankle 3+ View Left [[id number]] Resulted: 07/26/23 1000     Updated: 07/26/23 1001    Narrative:      Left Ankle X-Ray 07/26/23   Indication: Pain  Views: 3 weight bearing , comparison none  Findings: xrays reviewed by me today in the office and show, No fracture,   No bony lesion, Soft tissues normal, Normal joint spaces with mortise   well-aligned, no evidence of syndesmosis widening                Assessment / Plan    Assessment/Plan:   Diagnoses and all orders for this visit:    1. Left ankle pain, unspecified chronicity (Primary)  -     XR Ankle 3+ View Left  -     XR Foot 3+ View Left      Discussed midfoot arthritis with patient which has been present for over a year causing pain that has progressed (a chronic illness with exacerbation). Decision regarding surgical intervention considered. Patient is not a candidate due to trial of nonoperative management. I explained that I believe that arthritis of his midfoot joints are the primary cause of his symptoms.  I explained that arthritis by definition is a loss of cartilage. We do not make cartilage in our bodies as adults,  once it begins to wear out it will continue to wear out. As this happens, the body puts extra bone around the joint.  We call this osteophyte formation.    We discussed treatment options at length:  1. Carbon fiber insert orthotics, rocker-bottom shoes  2. NSAIDs (OTC Aleve, Advil, etc) when painful I also recommended voltaren gel   3. Cortisone injection - the injection can be done every 3 months    Surgery is reserved for failure of conservative treatment.      Provided patient with educational material midfoot arthritis as well as where to obtain carbon fiber insoles as well as rocker-bottom shoes.  Patient stated he would try all of these conservative treatment modalities and return to clinic if symptoms persist or worsen at which time we would likely try injection.  It was a pleasure seeing him today.    Follow Up:   Return if symptoms worsen or fail to improve.      Reilly Miller MD  [facility] [place] [facility]